# Patient Record
(demographics unavailable — no encounter records)

---

## 2025-07-17 NOTE — DISCUSSION/SUMMARY
[FreeTextEntry1] : 29 y/o Birth control f/u  -Continue with birth control pill- correct use reviewed. Risks and benefits of birth control pills discussed with patient including increased risk of thromboembolism. She would like to continue.  -Encouraged to start prenatal vitamins when she would like to start trying to conceive.   RTO for gyn annual after July 26, 2025 and prn.

## 2025-07-17 NOTE — REASON FOR VISIT
[Home] : at home, [unfilled] , at the time of the visit. [Medical Office: ([facility])___] : at the medical office located in  [Telehealth (audio & video)] : This visit was provided via telehealth using real-time 2-way audio visual technology. [Verbal consent obtained from patient] : the patient, [unfilled]

## 2025-07-17 NOTE — HISTORY OF PRESENT ILLNESS
[FreeTextEntry1] : Tootie is 31 y/o G0 and presents for bc f/u. Reports she is doing well. Has hx of migraines with auras and has had no migraines since starting medication for a few months now. LMP was on 07/13/25.  States happy with current progesterone only contraceptive pill. Is happy with current contraceptive method. Reports she is thinking about pregnancy. Denies GYN complaints.